# Patient Record
(demographics unavailable — no encounter records)

---

## 2025-06-03 NOTE — DATA REVIEWED
[de-identified] :  Type A tymp AD. Type As tymp AS. Testing via inserts: AD- WNL. AS- WNL up to 3kHz, sloping to a mild SNHL. Recs: 1) F/u w/ MD 2) Further tests & recs as per MD

## 2025-06-03 NOTE — REASON FOR VISIT
[Subsequent Evaluation] : a subsequent evaluation for [Ear Drainage] : ear drainage [Ear Pain] : ear pain [FreeTextEntry2] : headaches

## 2025-06-03 NOTE — ASSESSMENT
[FreeTextEntry1] : Patient with hyperacusis and occ ear discomfort -  NO evidence of sinusitis - has sl tender tmj - audio and tymp normal - exam otherwise neg.  Feel issue likely vestibular migraine in view of migraine hx however also recommended treatment for tmj with warm compresses, nsaids and soft foods and discussed dental eval for oral appliance - follow ujp as needed.    Can use otc flonase for tmj

## 2025-06-03 NOTE — PHYSICAL EXAM
[Nasal Endoscopy Performed] : nasal endoscopy was performed, see procedure section for findings [] : septum deviated to the right [Midline] : trachea located in midline position [Normal] : no rashes [de-identified] : sl tender tmj  [de-identified] : mod hypertrophy

## 2025-06-03 NOTE — REVIEW OF SYSTEMS
[Ear Pain] : ear pain [Ear Itch] : ear itch [Dizziness] : dizziness [Ear Drainage] : ear drainage [Ear Noises] : ear noises [Lightheadedness] : lightheadedness [Negative] : Heme/Lymph [Hearing Loss] : no hearing loss [Vertigo] : no vertigo [de-identified] : sensitivity to all noises , off balance issue